# Patient Record
Sex: FEMALE | Race: WHITE
[De-identification: names, ages, dates, MRNs, and addresses within clinical notes are randomized per-mention and may not be internally consistent; named-entity substitution may affect disease eponyms.]

---

## 2020-09-11 ENCOUNTER — HOSPITAL ENCOUNTER (OUTPATIENT)
Dept: HOSPITAL 95 - ORSCSDS | Age: 65
Discharge: HOME | End: 2020-09-11
Attending: PODIATRIST
Payer: COMMERCIAL

## 2020-09-11 VITALS — BODY MASS INDEX: 31.35 KG/M2 | HEIGHT: 67.01 IN | WEIGHT: 199.74 LBS

## 2020-09-11 DIAGNOSIS — M24.573: ICD-10-CM

## 2020-09-11 DIAGNOSIS — M76.61: Primary | ICD-10-CM

## 2020-09-11 DIAGNOSIS — I10: ICD-10-CM

## 2020-09-11 PROCEDURE — 0LSN0ZZ REPOSITION RIGHT LOWER LEG TENDON, OPEN APPROACH: ICD-10-PCS | Performed by: PODIATRIST

## 2020-09-11 PROCEDURE — 0QBL0ZZ EXCISION OF RIGHT TARSAL, OPEN APPROACH: ICD-10-PCS | Performed by: PODIATRIST

## 2020-09-11 PROCEDURE — C1713 ANCHOR/SCREW BN/BN,TIS/BN: HCPCS

## 2020-09-11 NOTE — NUR
09/11/20 1119 PERRY SHARMA
PATIENT TO STEP DOWN. TRANSFER TO RECLINER. VSS - PT HYPERTENSIVE AT
180'S SYSTOLIC, SLIGHTLY HIGHER THAN BASELINE. PT TOLERATING PO
INTAKE. PAT DENIES NAUSEA, REPORTS DULL, ACHE IN FOOT. PATIENT IS ABLE
TO REST EASILY WITHOUT GRIMACE OR WINCE. PATIENT'S HJUSBAND TO
BEDSIDE.

## 2020-09-11 NOTE — NUR
09/11/20 0925 AMADO GARCIA
POSSIBLE BRUIT AUSCULTATED BY JOHNNY FISCHER AND INFORMATION RELAYED TO
ANESTHESIOLOGIST

## 2022-05-02 ENCOUNTER — HOSPITAL ENCOUNTER (OUTPATIENT)
Dept: HOSPITAL 95 - ORSCSDS | Age: 67
Discharge: HOME | End: 2022-05-02
Attending: PODIATRIST
Payer: COMMERCIAL

## 2022-05-02 VITALS — BODY MASS INDEX: 45.99 KG/M2 | WEIGHT: 293 LBS | HEIGHT: 67 IN

## 2022-05-02 DIAGNOSIS — I10: ICD-10-CM

## 2022-05-02 DIAGNOSIS — M77.51: Primary | ICD-10-CM

## 2022-05-02 DIAGNOSIS — M77.52: ICD-10-CM

## 2022-05-02 DIAGNOSIS — E66.9: ICD-10-CM

## 2022-05-02 DIAGNOSIS — Z79.899: ICD-10-CM

## 2022-05-02 PROCEDURE — 0QBR0ZZ EXCISION OF LEFT TOE PHALANX, OPEN APPROACH: ICD-10-PCS | Performed by: PODIATRIST

## 2022-05-02 PROCEDURE — 0QBQ0ZZ EXCISION OF RIGHT TOE PHALANX, OPEN APPROACH: ICD-10-PCS | Performed by: PODIATRIST

## 2022-05-02 NOTE — NUR
05/02/22 1314 CRIS KIRKPATRICK
PT VERY PLEASANT. DENIES PAIN WHILE IN SDU. NO ISSUES WITH THIS
PATIENT OR HER  WHO WAS ALSO VERY PLEASANT.

## 2022-05-02 NOTE — NUR
05/02/22 1217 Candido Lamas
0.15MG EPINEPHRINE ADDED TO 30ML OF BUPIVACAINE 0.5% TO MAKE LOCAL
INJECTION OF 0.5% BUPIVACAINE WITH 1:200,000 EPINEPHRINE. [facility]